# Patient Record
Sex: FEMALE | Race: BLACK OR AFRICAN AMERICAN | NOT HISPANIC OR LATINO | URBAN - METROPOLITAN AREA
[De-identification: names, ages, dates, MRNs, and addresses within clinical notes are randomized per-mention and may not be internally consistent; named-entity substitution may affect disease eponyms.]

---

## 2018-10-19 PROBLEM — R10.9 ABDOMINAL PAIN AFFECTING PREGNANCY: Status: ACTIVE | Noted: 2018-10-19

## 2018-10-19 PROBLEM — O26.899 ABDOMINAL PAIN AFFECTING PREGNANCY: Status: ACTIVE | Noted: 2018-10-19

## 2018-10-22 ENCOUNTER — LAB VISIT (OUTPATIENT)
Dept: LAB | Facility: HOSPITAL | Age: 28
End: 2018-10-22
Attending: OBSTETRICS & GYNECOLOGY
Payer: MEDICAID

## 2018-10-22 ENCOUNTER — INITIAL PRENATAL (OUTPATIENT)
Dept: OBSTETRICS AND GYNECOLOGY | Facility: CLINIC | Age: 28
End: 2018-10-22
Payer: MEDICAID

## 2018-10-22 VITALS
WEIGHT: 161.25 LBS | SYSTOLIC BLOOD PRESSURE: 122 MMHG | BODY MASS INDEX: 28.57 KG/M2 | DIASTOLIC BLOOD PRESSURE: 76 MMHG

## 2018-10-22 DIAGNOSIS — O36.5931 SMALL FOR DATES AFFECTING MANAGEMENT OF MOTHER, THIRD TRIMESTER, FETUS 1: ICD-10-CM

## 2018-10-22 DIAGNOSIS — Z34.83 ENCOUNTER FOR SUPERVISION OF OTHER NORMAL PREGNANCY IN THIRD TRIMESTER: ICD-10-CM

## 2018-10-22 DIAGNOSIS — Z34.83 ENCOUNTER FOR SUPERVISION OF OTHER NORMAL PREGNANCY IN THIRD TRIMESTER: Primary | ICD-10-CM

## 2018-10-22 LAB
ABO + RH BLD: NORMAL
ALBUMIN SERPL BCP-MCNC: 2.9 G/DL
ALP SERPL-CCNC: 103 U/L
ALT SERPL W/O P-5'-P-CCNC: 19 U/L
ANION GAP SERPL CALC-SCNC: 9 MMOL/L
AST SERPL-CCNC: 25 U/L
BILIRUB SERPL-MCNC: 0.3 MG/DL
BLD GP AB SCN CELLS X3 SERPL QL: NORMAL
BUN SERPL-MCNC: 6 MG/DL
CALCIUM SERPL-MCNC: 9.7 MG/DL
CHLORIDE SERPL-SCNC: 105 MMOL/L
CO2 SERPL-SCNC: 23 MMOL/L
CREAT SERPL-MCNC: 0.7 MG/DL
ERYTHROCYTE [DISTWIDTH] IN BLOOD BY AUTOMATED COUNT: 14.2 %
EST. GFR  (AFRICAN AMERICAN): >60 ML/MIN/1.73 M^2
EST. GFR  (NON AFRICAN AMERICAN): >60 ML/MIN/1.73 M^2
GLUCOSE SERPL-MCNC: 76 MG/DL
HCT VFR BLD AUTO: 36.5 %
HGB BLD-MCNC: 11.4 G/DL
HGB S BLD QL SOLY: NEGATIVE
HIV 1+2 AB+HIV1 P24 AG SERPL QL IA: NEGATIVE
MCH RBC QN AUTO: 21.8 PG
MCHC RBC AUTO-ENTMCNC: 31.2 G/DL
MCV RBC AUTO: 70 FL
PLATELET # BLD AUTO: 208 K/UL
PMV BLD AUTO: 11.2 FL
POTASSIUM SERPL-SCNC: 3.8 MMOL/L
PROT SERPL-MCNC: 7 G/DL
RBC # BLD AUTO: 5.24 M/UL
SODIUM SERPL-SCNC: 137 MMOL/L
TSH SERPL DL<=0.005 MIU/L-ACNC: 0.97 UIU/ML
WBC # BLD AUTO: 8.17 K/UL

## 2018-10-22 PROCEDURE — 86592 SYPHILIS TEST NON-TREP QUAL: CPT

## 2018-10-22 PROCEDURE — 80053 COMPREHEN METABOLIC PANEL: CPT

## 2018-10-22 PROCEDURE — 86762 RUBELLA ANTIBODY: CPT

## 2018-10-22 PROCEDURE — 99203 OFFICE O/P NEW LOW 30 MIN: CPT | Mod: TH,S$PBB,, | Performed by: OBSTETRICS & GYNECOLOGY

## 2018-10-22 PROCEDURE — 84443 ASSAY THYROID STIM HORMONE: CPT

## 2018-10-22 PROCEDURE — 87340 HEPATITIS B SURFACE AG IA: CPT

## 2018-10-22 PROCEDURE — 87660 TRICHOMONAS VAGIN DIR PROBE: CPT

## 2018-10-22 PROCEDURE — 99999 PR PBB SHADOW E&M-EST. PATIENT-LVL III: CPT | Mod: PBBFAC,,, | Performed by: OBSTETRICS & GYNECOLOGY

## 2018-10-22 PROCEDURE — 86703 HIV-1/HIV-2 1 RESULT ANTBDY: CPT

## 2018-10-22 PROCEDURE — 87086 URINE CULTURE/COLONY COUNT: CPT

## 2018-10-22 PROCEDURE — 85660 RBC SICKLE CELL TEST: CPT

## 2018-10-22 PROCEDURE — 87491 CHLMYD TRACH DNA AMP PROBE: CPT

## 2018-10-22 PROCEDURE — 85027 COMPLETE CBC AUTOMATED: CPT

## 2018-10-22 PROCEDURE — 86850 RBC ANTIBODY SCREEN: CPT

## 2018-10-22 PROCEDURE — 99213 OFFICE O/P EST LOW 20 MIN: CPT | Mod: PBBFAC,PO,25 | Performed by: OBSTETRICS & GYNECOLOGY

## 2018-10-22 NOTE — PROGRESS NOTES
Affirm/GC/CT done. Consents signed. OB labs although transfer of care from [facility]  OBSTETRIC HISTORY:   OB History      Para Term  AB Living    2 1       1    SAB TAB Ectopic Multiple Live Births            1           COMPREHENSIVE GYN HISTORY:  PAP History: Denies abnormal Paps.  Infection History: Denies STDs. Denies PID.  Benign History: Denies uterine fibroids. Denies ovarian cysts. Denies endometriosis.   Cancer History: Denies cervical cancer. Denies uterine cancer or hyperplasia. Denies ovarian cancer. Denies vulvar cancer or pre-cancer. Denies vaginal cancer or pre-cancer. Denies breast cancer. Denies colon cancer.  Sexual Activity History:   reports that she currently engages in sexual activity and has had partners who are Male. She reports using the following method of birth control/protection: None.   HPI:   28 y.o.  No LMP recorded. Patient is pregnant.   Patient is  here for pregnancy. Had prenatal care in the [facility]. No problems last pregnancy and nothing currently other than todays complaint of pressure. She denies bladder, breast and bowel complaints.    ROS:  GENERAL: Denies weight gain or weight loss. Feeling well overall.   SKIN: Denies rash or lesions.   HEAD: Denies headache.   NODES: Denies enlarged lymph nodes.   CHEST: Denies shortness of breath.   ABDOMEN: No abdominal pain, constipation, diarrhea, nausea, vomiting or rectal bleeding.   URINARY: No frequency, dysuria, hematuria, or burning on urination.  REPRODUCTIVE: See HPI.   BREASTS: The patient denies pain, lumps, or nipple discharge.   HEMATOLOGIC: No easy bruisability.   MUSCULOSKELETAL: Denies joint pain or back pain.   NEUROLOGIC: Denies weakness.   PSYCHIATRIC: Denies depression, anxiety or mood swings.    PE:   /76   APPEARANCE: Well nourished, well developed, in no acute distress.  ABDOMEN: Soft. No tenderness or masses. No hernias.  PELVIC:   VULVA: No lesions. Normal female genitalia.  URETHRAL  MEATUS: Normal size and location, no lesions, no prolapse.  URETHRA: No masses, tenderness, prolapse or scarring.  VAGINA: Moist and well rugated, some discharge, no significant cystocele or rectocele.  CERVIX: No lesions and discharge. Cervix closed  UTERUS: Normal size, regular shape, mobile, non-tender, bladder base nontender.  ADNEXA: No masses or tenderness.    ASSESSMENT:  Pregnancy  Discharge  Pressure  Transfer of care    PLAN:  RTO 1 week  US small for dates follow up anatomy and growth

## 2018-10-23 LAB
C TRACH DNA SPEC QL NAA+PROBE: NOT DETECTED
CANDIDA RRNA VAG QL PROBE: NEGATIVE
G VAGINALIS RRNA GENITAL QL PROBE: POSITIVE
HBV SURFACE AG SERPL QL IA: NEGATIVE
N GONORRHOEA DNA SPEC QL NAA+PROBE: NOT DETECTED
RPR SER QL: NORMAL
RUBV IGG SER-ACNC: 40.8 IU/ML
RUBV IGG SER-IMP: REACTIVE
T VAGINALIS RRNA GENITAL QL PROBE: NEGATIVE

## 2018-10-24 ENCOUNTER — TELEPHONE (OUTPATIENT)
Dept: OBSTETRICS AND GYNECOLOGY | Facility: CLINIC | Age: 28
End: 2018-10-24

## 2018-10-24 LAB — BACTERIA UR CULT: NO GROWTH

## 2018-10-24 RX ORDER — METRONIDAZOLE 250 MG/1
250 TABLET ORAL 3 TIMES DAILY
Qty: 21 TABLET | Refills: 0 | Status: SHIPPED | OUTPATIENT
Start: 2018-10-24 | End: 2018-10-31

## 2018-10-24 NOTE — TELEPHONE ENCOUNTER
----- Message from Mallory Tipton sent at 10/24/2018  1:14 PM CDT -----  Contact: [phone]/ self   Patient called in returning your call. Please advise.

## 2018-10-31 ENCOUNTER — ROUTINE PRENATAL (OUTPATIENT)
Dept: OBSTETRICS AND GYNECOLOGY | Facility: CLINIC | Age: 28
End: 2018-10-31
Payer: MEDICAID

## 2018-10-31 ENCOUNTER — PROCEDURE VISIT (OUTPATIENT)
Dept: OBSTETRICS AND GYNECOLOGY | Facility: CLINIC | Age: 28
End: 2018-10-31
Payer: MEDICAID

## 2018-10-31 VITALS
BODY MASS INDEX: 28.76 KG/M2 | DIASTOLIC BLOOD PRESSURE: 76 MMHG | WEIGHT: 162.38 LBS | SYSTOLIC BLOOD PRESSURE: 108 MMHG

## 2018-10-31 DIAGNOSIS — Z34.83 ENCOUNTER FOR SUPERVISION OF OTHER NORMAL PREGNANCY IN THIRD TRIMESTER: Primary | ICD-10-CM

## 2018-10-31 DIAGNOSIS — Z3A.33 33 WEEKS GESTATION OF PREGNANCY: ICD-10-CM

## 2018-10-31 DIAGNOSIS — Z36.3 ANTENATAL SCREENING FOR MALFORMATION USING ULTRASONICS: Primary | ICD-10-CM

## 2018-10-31 DIAGNOSIS — O36.5931 SMALL FOR DATES AFFECTING MANAGEMENT OF MOTHER, THIRD TRIMESTER, FETUS 1: ICD-10-CM

## 2018-10-31 PROCEDURE — 76805 OB US >/= 14 WKS SNGL FETUS: CPT | Mod: PBBFAC,PO

## 2018-10-31 PROCEDURE — 99213 OFFICE O/P EST LOW 20 MIN: CPT | Mod: TH,25,S$PBB, | Performed by: OBSTETRICS & GYNECOLOGY

## 2018-10-31 PROCEDURE — 99999 PR PBB SHADOW E&M-EST. PATIENT-LVL II: CPT | Mod: PBBFAC,,, | Performed by: OBSTETRICS & GYNECOLOGY

## 2018-10-31 PROCEDURE — 76805 OB US >/= 14 WKS SNGL FETUS: CPT | Mod: 26,S$PBB,, | Performed by: OBSTETRICS & GYNECOLOGY

## 2018-10-31 PROCEDURE — 99212 OFFICE O/P EST SF 10 MIN: CPT | Mod: PBBFAC,TH,PO,25 | Performed by: OBSTETRICS & GYNECOLOGY

## 2018-11-14 ENCOUNTER — ROUTINE PRENATAL (OUTPATIENT)
Dept: OBSTETRICS AND GYNECOLOGY | Facility: CLINIC | Age: 28
End: 2018-11-14
Payer: MEDICAID

## 2018-11-14 VITALS
SYSTOLIC BLOOD PRESSURE: 120 MMHG | BODY MASS INDEX: 29.33 KG/M2 | WEIGHT: 165.56 LBS | DIASTOLIC BLOOD PRESSURE: 72 MMHG

## 2018-11-14 DIAGNOSIS — Z3A.35 35 WEEKS GESTATION OF PREGNANCY: ICD-10-CM

## 2018-11-14 DIAGNOSIS — Z36.85 ANTENATAL SCREENING FOR STREPTOCOCCUS B: ICD-10-CM

## 2018-11-14 DIAGNOSIS — Z34.83 ENCOUNTER FOR SUPERVISION OF OTHER NORMAL PREGNANCY IN THIRD TRIMESTER: Primary | ICD-10-CM

## 2018-11-14 PROCEDURE — 99212 OFFICE O/P EST SF 10 MIN: CPT | Mod: PBBFAC,TH,PO | Performed by: OBSTETRICS & GYNECOLOGY

## 2018-11-14 PROCEDURE — 87081 CULTURE SCREEN ONLY: CPT

## 2018-11-14 PROCEDURE — 99999 PR PBB SHADOW E&M-EST. PATIENT-LVL II: CPT | Mod: PBBFAC,,, | Performed by: OBSTETRICS & GYNECOLOGY

## 2018-11-14 PROCEDURE — 99213 OFFICE O/P EST LOW 20 MIN: CPT | Mod: TH,S$PBB,, | Performed by: OBSTETRICS & GYNECOLOGY

## 2018-11-14 NOTE — PROGRESS NOTES
Patient is complaining of having pressure in the lower pelvic area since yesterday.Patient has trace of leukocytes in urine.

## 2018-11-17 LAB — BACTERIA SPEC AEROBE CULT: NORMAL

## 2018-11-26 ENCOUNTER — ROUTINE PRENATAL (OUTPATIENT)
Dept: OBSTETRICS AND GYNECOLOGY | Facility: CLINIC | Age: 28
End: 2018-11-26
Payer: MEDICAID

## 2018-11-26 VITALS
BODY MASS INDEX: 29.91 KG/M2 | SYSTOLIC BLOOD PRESSURE: 116 MMHG | DIASTOLIC BLOOD PRESSURE: 66 MMHG | WEIGHT: 168.88 LBS

## 2018-11-26 DIAGNOSIS — Z34.83 ENCOUNTER FOR SUPERVISION OF OTHER NORMAL PREGNANCY IN THIRD TRIMESTER: Primary | ICD-10-CM

## 2018-11-26 DIAGNOSIS — Z3A.37 37 WEEKS GESTATION OF PREGNANCY: ICD-10-CM

## 2018-11-26 PROCEDURE — 99213 OFFICE O/P EST LOW 20 MIN: CPT | Mod: TH,S$PBB,, | Performed by: OBSTETRICS & GYNECOLOGY

## 2018-11-26 PROCEDURE — 99999 PR PBB SHADOW E&M-EST. PATIENT-LVL II: CPT | Mod: PBBFAC,,, | Performed by: OBSTETRICS & GYNECOLOGY

## 2018-11-26 PROCEDURE — 99212 OFFICE O/P EST SF 10 MIN: CPT | Mod: PBBFAC,TH,PO | Performed by: OBSTETRICS & GYNECOLOGY

## 2018-11-26 NOTE — PROGRESS NOTES
Patient complaining of her Right side of abdomen hurting only when she touches it. Patient also complaining of clear discharge, more than usual.

## 2018-11-29 ENCOUNTER — ANESTHESIA (OUTPATIENT)
Dept: OBSTETRICS AND GYNECOLOGY | Facility: HOSPITAL | Age: 28
End: 2018-11-29
Payer: MEDICAID

## 2018-11-29 ENCOUNTER — ANESTHESIA EVENT (OUTPATIENT)
Dept: OBSTETRICS AND GYNECOLOGY | Facility: HOSPITAL | Age: 28
End: 2018-11-29
Payer: MEDICAID

## 2018-11-29 ENCOUNTER — HOSPITAL ENCOUNTER (INPATIENT)
Facility: HOSPITAL | Age: 28
LOS: 2 days | Discharge: HOME | End: 2018-12-01
Attending: OBSTETRICS & GYNECOLOGY | Admitting: OBSTETRICS & GYNECOLOGY
Payer: MEDICAID

## 2018-11-29 DIAGNOSIS — Z3A.37 37 WEEKS GESTATION OF PREGNANCY: ICD-10-CM

## 2018-11-29 LAB
ABO + RH BLD: NORMAL
BASOPHILS # BLD AUTO: 0.01 K/UL
BASOPHILS NFR BLD: 0.1 %
BLD GP AB SCN CELLS X3 SERPL QL: NORMAL
DIFFERENTIAL METHOD: ABNORMAL
EOSINOPHIL # BLD AUTO: 0.1 K/UL
EOSINOPHIL NFR BLD: 0.7 %
ERYTHROCYTE [DISTWIDTH] IN BLOOD BY AUTOMATED COUNT: 14.2 %
HCT VFR BLD AUTO: 36.6 %
HGB BLD-MCNC: 11.4 G/DL
LYMPHOCYTES # BLD AUTO: 2.4 K/UL
LYMPHOCYTES NFR BLD: 26.7 %
MCH RBC QN AUTO: 21.6 PG
MCHC RBC AUTO-ENTMCNC: 31.1 G/DL
MCV RBC AUTO: 69 FL
MONOCYTES # BLD AUTO: 1 K/UL
MONOCYTES NFR BLD: 11 %
NEUTROPHILS # BLD AUTO: 5.4 K/UL
NEUTROPHILS NFR BLD: 61.5 %
PLATELET # BLD AUTO: 130 K/UL
PLATELET BLD QL SMEAR: ABNORMAL
PMV BLD AUTO: 11.2 FL
RBC # BLD AUTO: 5.28 M/UL
WBC # BLD AUTO: 8.79 K/UL

## 2018-11-29 PROCEDURE — 72100003 HC LABOR CARE, EA. ADDL. 8 HRS

## 2018-11-29 PROCEDURE — 25000003 PHARM REV CODE 250: Performed by: OBSTETRICS & GYNECOLOGY

## 2018-11-29 PROCEDURE — 63600175 PHARM REV CODE 636 W HCPCS

## 2018-11-29 PROCEDURE — 72100002 HC LABOR CARE, 1ST 8 HOURS

## 2018-11-29 PROCEDURE — 86850 RBC ANTIBODY SCREEN: CPT

## 2018-11-29 PROCEDURE — 85025 COMPLETE CBC W/AUTO DIFF WBC: CPT

## 2018-11-29 PROCEDURE — 11000001 HC ACUTE MED/SURG PRIVATE ROOM

## 2018-11-29 PROCEDURE — 59409 OBSTETRICAL CARE: CPT | Mod: AT,,, | Performed by: OBSTETRICS & GYNECOLOGY

## 2018-11-29 PROCEDURE — 72200004 HC VAGINAL DELIVERY LEVEL I

## 2018-11-29 PROCEDURE — 36415 COLL VENOUS BLD VENIPUNCTURE: CPT

## 2018-11-29 PROCEDURE — 0HQ9XZZ REPAIR PERINEUM SKIN, EXTERNAL APPROACH: ICD-10-PCS | Performed by: OBSTETRICS & GYNECOLOGY

## 2018-11-29 RX ORDER — FENTANYL CITRATE 50 UG/ML
INJECTION, SOLUTION INTRAMUSCULAR; INTRAVENOUS
Status: DISCONTINUED
Start: 2018-11-29 | End: 2018-11-29 | Stop reason: WASHOUT

## 2018-11-29 RX ORDER — METHYLERGONOVINE MALEATE 0.2 MG/ML
INJECTION INTRAVENOUS
Status: COMPLETED
Start: 2018-11-29 | End: 2018-11-29

## 2018-11-29 RX ORDER — OXYTOCIN/RINGER'S LACTATE 20/1000 ML
333 PLASTIC BAG, INJECTION (ML) INTRAVENOUS CONTINUOUS
Status: ACTIVE | OUTPATIENT
Start: 2018-11-29 | End: 2018-11-29

## 2018-11-29 RX ORDER — DOCUSATE SODIUM 100 MG/1
200 CAPSULE, LIQUID FILLED ORAL 2 TIMES DAILY PRN
Status: DISCONTINUED | OUTPATIENT
Start: 2018-11-29 | End: 2018-12-01 | Stop reason: HOSPADM

## 2018-11-29 RX ORDER — ROPIVACAINE HYDROCHLORIDE 2 MG/ML
INJECTION, SOLUTION EPIDURAL; INFILTRATION; PERINEURAL
Status: DISPENSED
Start: 2018-11-29 | End: 2018-11-29

## 2018-11-29 RX ORDER — OXYCODONE AND ACETAMINOPHEN 5; 325 MG/1; MG/1
1 TABLET ORAL EVERY 4 HOURS PRN
Status: DISCONTINUED | OUTPATIENT
Start: 2018-11-29 | End: 2018-12-01 | Stop reason: HOSPADM

## 2018-11-29 RX ORDER — IBUPROFEN 600 MG/1
600 TABLET ORAL EVERY 6 HOURS PRN
Status: DISCONTINUED | OUTPATIENT
Start: 2018-11-29 | End: 2018-12-01 | Stop reason: HOSPADM

## 2018-11-29 RX ORDER — OXYTOCIN/RINGER'S LACTATE 20/1000 ML
41.7 PLASTIC BAG, INJECTION (ML) INTRAVENOUS CONTINUOUS
Status: ACTIVE | OUTPATIENT
Start: 2018-11-29 | End: 2018-11-29

## 2018-11-29 RX ORDER — ACETAMINOPHEN 325 MG/1
650 TABLET ORAL EVERY 6 HOURS PRN
Status: DISCONTINUED | OUTPATIENT
Start: 2018-11-29 | End: 2018-12-01 | Stop reason: HOSPADM

## 2018-11-29 RX ORDER — MISOPROSTOL 200 UG/1
600 TABLET ORAL
Status: DISCONTINUED | OUTPATIENT
Start: 2018-11-29 | End: 2018-12-01 | Stop reason: HOSPADM

## 2018-11-29 RX ORDER — HYDROCORTISONE 25 MG/G
CREAM TOPICAL 3 TIMES DAILY PRN
Status: DISCONTINUED | OUTPATIENT
Start: 2018-11-29 | End: 2018-12-01 | Stop reason: HOSPADM

## 2018-11-29 RX ORDER — ONDANSETRON 8 MG/1
8 TABLET, ORALLY DISINTEGRATING ORAL EVERY 8 HOURS PRN
Status: DISCONTINUED | OUTPATIENT
Start: 2018-11-29 | End: 2018-12-01 | Stop reason: HOSPADM

## 2018-11-29 RX ORDER — OXYTOCIN 10 [USP'U]/ML
INJECTION, SOLUTION INTRAMUSCULAR; INTRAVENOUS
Status: COMPLETED
Start: 2018-11-29 | End: 2018-11-29

## 2018-11-29 RX ORDER — OXYTOCIN/RINGER'S LACTATE 20/1000 ML
41.65 PLASTIC BAG, INJECTION (ML) INTRAVENOUS CONTINUOUS
Status: ACTIVE | OUTPATIENT
Start: 2018-11-29 | End: 2018-11-29

## 2018-11-29 RX ORDER — SODIUM CHLORIDE, SODIUM LACTATE, POTASSIUM CHLORIDE, CALCIUM CHLORIDE 600; 310; 30; 20 MG/100ML; MG/100ML; MG/100ML; MG/100ML
INJECTION, SOLUTION INTRAVENOUS CONTINUOUS
Status: DISCONTINUED | OUTPATIENT
Start: 2018-11-29 | End: 2018-12-01 | Stop reason: HOSPADM

## 2018-11-29 RX ORDER — OXYCODONE AND ACETAMINOPHEN 10; 325 MG/1; MG/1
1 TABLET ORAL EVERY 4 HOURS PRN
Status: DISCONTINUED | OUTPATIENT
Start: 2018-11-29 | End: 2018-12-01 | Stop reason: HOSPADM

## 2018-11-29 RX ORDER — SODIUM CHLORIDE 9 MG/ML
INJECTION, SOLUTION INTRAVENOUS
Status: DISCONTINUED | OUTPATIENT
Start: 2018-11-29 | End: 2018-12-01 | Stop reason: HOSPADM

## 2018-11-29 RX ORDER — MISOPROSTOL 200 UG/1
TABLET ORAL
Status: DISPENSED
Start: 2018-11-29 | End: 2018-11-29

## 2018-11-29 RX ORDER — LIDOCAINE HYDROCHLORIDE 10 MG/ML
INJECTION INFILTRATION; PERINEURAL
Status: DISPENSED
Start: 2018-11-29 | End: 2018-11-29

## 2018-11-29 RX ADMIN — IBUPROFEN 600 MG: 600 TABLET, FILM COATED ORAL at 08:11

## 2018-11-29 RX ADMIN — METHYLERGONOVINE MALEATE 200 MCG: 0.2 INJECTION, SOLUTION INTRAMUSCULAR; INTRAVENOUS at 09:11

## 2018-11-29 RX ADMIN — OXYCODONE HYDROCHLORIDE AND ACETAMINOPHEN 1 TABLET: 10; 325 TABLET ORAL at 02:11

## 2018-11-29 RX ADMIN — OXYCODONE HYDROCHLORIDE AND ACETAMINOPHEN 1 TABLET: 10; 325 TABLET ORAL at 10:11

## 2018-11-29 RX ADMIN — MISOPROSTOL 600 MCG: 200 TABLET ORAL at 09:11

## 2018-11-29 RX ADMIN — OXYCODONE HYDROCHLORIDE AND ACETAMINOPHEN 1 TABLET: 10; 325 TABLET ORAL at 08:11

## 2018-11-29 RX ADMIN — OXYTOCIN 10 UNITS: 10 INJECTION, SOLUTION INTRAMUSCULAR; INTRAVENOUS at 09:11

## 2018-11-29 NOTE — L&D DELIVERY NOTE
[facility]  Vaginal Delivery   Operative Note    SUMMARY   Date of Surgery: 18    Pre-Operative Diagnosis:   Term pregnancy       Post-Operative Diagnosis:   Same   Viable female infant  Nuchal cord x 1  Thin meconium  Uterine atony    Surgery:     Surgeon: MD Kvng    Anesthesia: None    EBL: 900 cc    Findings:   Viable female infant with 9/9 Apgars  Placenta delivered spontaneous intact  Position: Vertex, HIREN  Nuchal cord x 1    Episiotomy:   None    Lacerations:   Periurethral first degree no repair    Specimens: None    Complications: None      Normal spontaneous vaginal delivery of live infant, skin to skin was unable to be performed due to meconium needing NNP evaluation.  Infant delivered position HIREN over intact perineum.  Nuchal cord: Yes, cord reduced at perineum.    Spontaneous delivery of placenta and IV pitocin given noting uterine atony with bleeding.  1st degree laceration noted.  Patient tolerated delivery well. Sponge needle and lap counted correctly x2.    Indications: <principal problem not specified>  Pregnancy complicated by:   Patient Active Problem List   Diagnosis    Abdominal pain affecting pregnancy    37 weeks gestation of pregnancy     Admitting GA: 37w3d    Delivery Information for  Mirza Metz    Birth information:  YOB: 2017   Time of birth: 9:06 AM   Sex: female   Head Delivery Date/Time: 2018  9:06 AM   Delivery type: Vaginal, Spontaneous   Gestational Age: 37w3d    Delivery Providers    Delivering clinician:  Coby Patel MD   Provider Role    Tawanna Chavez RN Circulator    El[facility] Surgical Tech    Mikayla Mcpherson RN Registered Nurse    Deidre Friend NP Nurse Practitioner            Measurements    Weight:  2742 g  Length:           Apgars    Living status:  Living  Apgars:   1 min.:   5 min.:   10 min.:   15 min.:   20 min.:     Skin color:   1  1       Heart rate:   2  2       Reflex irritability:   2   2       Muscle tone:   2  2       Respiratory effort:   2  2       Total:   9  9       Apgars assigned by:  LATONIA STEVEN         Operative Delivery    Forceps attempted?:  No  Vacuum extractor attempted?:  No         Shoulder Dystocia    Shoulder dystocia present?:  No           Presentation    Presentation:  Vertex  Position:  Left Occiput Anterior           Interventions/Resuscitation    Method:  Tactile Stimulation, Bulb Suctioning       Cord    Vessels:  3 vessels  Complications:  Nuchal  Nuchal Intervention:  reduced  Nuchal Cord Description:  loose nuchal cord  Number of Loops:  1  Delayed Cord Clamping?:  No  Cord Clamped Date/Time:  2018  9:06 AM  Cord Blood Disposition:  Lab, Sent with Baby  Gases Sent?:  No  Stem Cell Collection (by MD):  No       Placenta    Placenta delivery date/time:  2018 09  Placenta removal:  Spontaneous  Placenta appearance:  Intact  Placenta disposition:  discarded           Labor Events:       labor: Yes     Labor Onset Date/Time:         Dilation Complete Date/Time:         Start Pushing Date/Time:       Rupture Date/Time:              Rupture type:           Fluid Amount:        Fluid Color:        Fluid Odor:        Membrane Status (PeriCalm): INT (Intact)      Rupture Date/Time (PeriCalm):        Fluid Amount (PeriCalm):        Fluid Color (PeriCalm):         steroids:       Antibiotics given for GBS:       Induction: none     Indications for induction:        Augmentation:       Indications for augmentation:       Labor complications: None     Additional complications:          Cervical ripening:                     Delivery:      Episiotomy: None     Indication for Episiotomy:       Perineal Lacerations: None Repaired:      Periurethral Laceration: bilateral Repaired: No   Labial Laceration: none Repaired:     Sulcus Laceration: none Repaired:     Vaginal Laceration: No Repaired:     Cervical Laceration: No Repaired:     Repair suture:        Repair # of packets: 0     Vaginal delivery QBL (mL):        QBL (mL): 0     Combined Blood Loss (mL): 0     Vaginal Sweep Performed: Yes     Surgicount Correct: Yes       Other providers:       Anesthesia    Method:  None          Details (if applicable):  Trial of Labor      Categorization:      Priority:     Indications for :     Incision Type:       Additional  information:  Forceps:    Vacuum:    Breech:    Observed anomalies    Other (Comments):

## 2018-11-29 NOTE — H&P
"HPI:   Virginia Metz is a 28 y.o. female  at 37 weeks 3 days EGA who presents here for contractions and is 6cm in triage. Her prenatal care was uncomplicated.    ROS:  GENERAL: Denies weight gain or weight loss. Feeling well overall.   SKIN: Denies rash or lesions.   HEAD: Denies head injury or headache.   CHEST: Denies chest pain or shortness of breath.   CARDIOVASCULAR: Denies palpitations or left sided chest pain.   ABDOMEN: No constipation, diarrhea, nausea, vomiting or rectal bleeding.   URINARY: No frequency, dysuria, hematuria, or burning on urination.  REPRODUCTIVE: See HPI.     Past Medical History:   Diagnosis Date    Asthma      No past surgical history on file.  Family History   Family history unknown: Yes     Patient has no known allergies.       PE:   /84   Pulse 88   Ht 5' 3" (1.6 m)   Wt 76.2 kg (168 lb)   Breastfeeding? No   BMI 29.76 kg/m²   APPEARANCE: Well nourished, well developed, in no acute distress.  CHEST: Lungs clear to auscultation.  HEART: Regular rate and rhythm, no murmurs, rubs or gallops.  ABDOMEN:  Gravid. NTND soft   SVE: now 8cm    Assessment:  IUP 37 weeks 3 days  Active labor  Category 1 Fetal Heart Tracing    Plan:   Expect delivery soon     "

## 2018-11-29 NOTE — PLAN OF CARE
0800    Fetal Assessment  External  135 bpm  Moderate varability  accels not present  variables present  Uterine Activity  External   Contractions 2-4 min  50-80 sec  Moderate in intensity   Soft resting tone    0830    Fetal Assessment  External  130 bpm  Moderate varability  accels present  variables present  Uterine Activity  External   Contractions 2-5 min  50-80 sec  Moderate in intensity   Soft resting tone    0900     Fetal Assessment  External  125 bpm  Moderate varability  accels present  variables present  Uterine Activity  External   Contractions 2-4 min  50-80 sec  Moderate in intensity   Soft resting tone

## 2018-11-29 NOTE — LACTATION NOTE
11/29/18 1015   Maternal Infant Assessment   Breast Size Issue none   Breast Shape Bilateral:;pendulous   Breast Density Bilateral:;soft   Areola Bilateral:;elastic   Nipple(s) Bilateral:;flat;graspable  (nipps flat but breast very moldable,nipps harley sl w/ stim.)   Nipple Symptoms bilateral:;other (see comments)  (no redness or tenderness to nipples)   Infant Assessment   Mouth Size average   Sucking Reflex present   Rooting Reflex present   Swallow Reflex present   LATCH Score   Latch 2-->grasps breast, tongue down, lips flanged, rhythmic sucking   Audible Swallowing 2-->spontaneous and intermittent (24 hrs old)   Type Of Nipple 2-->everted (after stimulation)  (sl everts w/ stimulation, br graspable)   Comfort (Breast/Nipple) 2-->soft/nontender   Hold (Positioning) 1-->minimal assist, teach one side: mother does other, staff holds   Score (less than 7 for 2/more consecutive times, consult Lactation Consultant) 9   Breasts WDL   Breasts WDL WDL   Pain/Comfort Assessments   Pain Assessment Performed Yes   Acceptable Comfort Level 4       Number Scale   Presence of Pain complains of pain/discomfort   Location - Side Bilateral   Location - Orientation lower   Location abdomen   Pain Rating: Rest 5   Pain Rating: Activity 5   Maternal Infant Feeding   Maternal Preparation breast care;hand hygiene   Maternal Emotional State relaxed   Infant Positioning cradle  (left cradle hold,deep latch w/ lips flanged)   Signs of Milk Transfer audible swallow;infant jaw motion present   Presence of Pain no   Time Spent (min) 15-30 min   Latch Assistance yes  (for deeper latch)   Engorgement Measures complete emptying encouraged;supportive bra encouraged   Breastfeeding Education adequate infant intake;adequate milk volume;importance of skin-to-skin contact;increasing milk supply;other (see comments)  (s/d,s2s,fdg.freq/akosua,I&O,nipp care, benefits,etc.)   Breastfeeding History   Breastfeeding History no  (did not BF other baby,  baby would not latch)   Infant First Feeding   Skin-to-Skin Contact, Duration continued   Feeding Infant   Feeding Readiness Cues eager;rooting;sucking motion present;sustained alertness   Feeding Tolerance/Success coordinated suck;coordinated swallow;alert for feeding;eager;strong suck   Effective Latch During Feeding yes   Audible Swallow yes   Suck/Swallow Coordination present   Skin-to-Skin Contact During Feeding yes   Lactation Referrals   Lactation Consult Initial assessment;Knowledge deficit;Other (Comment)  (reviewed [facility])   Lactation Interventions   Attachment Promotion breastfeeding assistance provided;counseling provided;environment adjusted;face-to-face positioning promoted;family involvement promoted;infant-mother separation minimized;privacy provided;role responsibility promoted;rooming-in promoted;skin-to-skin contact encouraged   Breastfeeding Assistance assisted with positioning;feeding cue recognition promoted;feeding on demand promoted;support offered   Maternal Breastfeeding Support diary/feeding log utilized;encouragement offered;infant-mother separation minimized;lactation counseling provided   Latch Promotion positioning assisted;infant moved to breast

## 2018-11-29 NOTE — PLAN OF CARE
Problem: Patient Care Overview  Goal: Plan of Care Review  Outcome: Ongoing (interventions implemented as appropriate)  1300 - assumed care of pt.  Vss, nad, pain well controlled w/po pain meds, tolerating regular diet, bleeding light.  Poc: pain management as needed, po hydration/ambulation encouraged, oriented pt to room.  Reviewed poc w/pt.  Pt verbalized understanding.

## 2018-11-29 NOTE — ANESTHESIA PREPROCEDURE EVALUATION
11/29/2018  Virginia Metz is a 28 y.o., female. Actively laboring. Currently not interested in epidural.     Past Medical History:   Diagnosis Date    Asthma      Pre-op Assessment    I have reviewed the Patient Summary Reports.     I have reviewed the Nursing Notes.   I have reviewed the Medications.     Review of Systems  Anesthesia Hx:  No previous Anesthesia  Neg history of prior surgery.   Social:  Non-Smoker, No Alcohol Use    Hematology/Oncology:     Oncology Normal     EENT/Dental:EENT/Dental Normal   Cardiovascular:  Cardiovascular Normal Exercise tolerance: good     Pulmonary:   Asthma    Renal/:  Renal/ Normal     Hepatic/GI:   GERD    Musculoskeletal:  Musculoskeletal Normal    Neurological:  Neurology Normal    Endocrine:  Endocrine Normal    Psych:  Psychiatric Normal           Physical Exam  General:  Well nourished    Airway/Jaw/Neck:  Airway Findings: Mouth Opening: Normal Tongue: Normal  General Airway Assessment: Adult  Mallampati: III  TM Distance: Normal, at least 6 cm     Eyes/Ears/Nose:  EYES/EARS/NOSE FINDINGS: Normal   Dental:  Dental Findings: In tact   Chest/Lungs:  Chest/Lungs Clear    Heart/Vascular:  Heart Findings: Normal       Mental Status:  Mental Status Findings: Normal        Anesthesia Plan  Type of Anesthesia, risks & benefits discussed:  Anesthesia Type:  CSE, epidural, spinal, general  Patient's Preference:   Intra-op Monitoring Plan:   Intra-op Monitoring Plan Comments:   Post Op Pain Control Plan:   Post Op Pain Control Plan Comments:   Induction:    Beta Blocker:         Informed Consent: Patient understands risks and agrees with Anesthesia plan.  Questions answered. Anesthesia consent signed with patient.  ASA Score: 2  emergent   Day of Surgery Review of History & Physical:

## 2018-11-30 LAB
ANISOCYTOSIS BLD QL SMEAR: SLIGHT
BASOPHILS # BLD AUTO: 0.02 K/UL
BASOPHILS NFR BLD: 0.2 %
DIFFERENTIAL METHOD: ABNORMAL
EOSINOPHIL # BLD AUTO: 0.1 K/UL
EOSINOPHIL NFR BLD: 0.5 %
ERYTHROCYTE [DISTWIDTH] IN BLOOD BY AUTOMATED COUNT: 14.3 %
GIANT PLATELETS BLD QL SMEAR: PRESENT
HCT VFR BLD AUTO: 33.4 %
HGB BLD-MCNC: 10.2 G/DL
HYPOCHROMIA BLD QL SMEAR: ABNORMAL
LYMPHOCYTES # BLD AUTO: 2.3 K/UL
LYMPHOCYTES NFR BLD: 19.1 %
MCH RBC QN AUTO: 21.3 PG
MCHC RBC AUTO-ENTMCNC: 30.5 G/DL
MCV RBC AUTO: 70 FL
MONOCYTES # BLD AUTO: 1.2 K/UL
MONOCYTES NFR BLD: 9.7 %
NEUTROPHILS # BLD AUTO: 8.4 K/UL
NEUTROPHILS NFR BLD: 70.5 %
PLATELET # BLD AUTO: 176 K/UL
PLATELET BLD QL SMEAR: ABNORMAL
PMV BLD AUTO: 11.7 FL
POIKILOCYTOSIS BLD QL SMEAR: SLIGHT
RBC # BLD AUTO: 4.8 M/UL
WBC # BLD AUTO: 12.01 K/UL

## 2018-11-30 PROCEDURE — 11000001 HC ACUTE MED/SURG PRIVATE ROOM

## 2018-11-30 PROCEDURE — 36415 COLL VENOUS BLD VENIPUNCTURE: CPT

## 2018-11-30 PROCEDURE — 99231 SBSQ HOSP IP/OBS SF/LOW 25: CPT | Mod: ,,, | Performed by: OBSTETRICS & GYNECOLOGY

## 2018-11-30 PROCEDURE — 85025 COMPLETE CBC W/AUTO DIFF WBC: CPT

## 2018-11-30 PROCEDURE — 25000003 PHARM REV CODE 250: Performed by: OBSTETRICS & GYNECOLOGY

## 2018-11-30 RX ORDER — OXYCODONE AND ACETAMINOPHEN 5; 325 MG/1; MG/1
1 TABLET ORAL EVERY 4 HOURS PRN
Qty: 15 TABLET | Refills: 0 | Status: SHIPPED | OUTPATIENT
Start: 2018-11-30 | End: 2019-01-03

## 2018-11-30 RX ORDER — IBUPROFEN 600 MG/1
600 TABLET ORAL EVERY 6 HOURS PRN
Qty: 60 TABLET | Refills: 1 | Status: SHIPPED | OUTPATIENT
Start: 2018-11-30

## 2018-11-30 RX ADMIN — IBUPROFEN 600 MG: 600 TABLET, FILM COATED ORAL at 06:11

## 2018-11-30 NOTE — ANESTHESIA POSTPROCEDURE EVALUATION
"Anesthesia Post Evaluation    Patient: Virginia Metz    Procedure(s) Performed: * No procedures listed *        Anesthetic complications: no              Visit Vitals  /81 (BP Location: Left arm, Patient Position: Lying)   Pulse 78   Temp 36.9 °C (98.5 °F) (Oral)   Resp 18   Ht 5' 3" (1.6 m)   Wt 76.2 kg (168 lb)   SpO2 100%   Breastfeeding? Yes   BMI 29.76 kg/m²       Pain/Yanet Score: Pain Rating Prior to Med Admin: 4 (11/30/2018  6:11 AM)  Pain Rating Post Med Admin: 0 (11/29/2018  9:40 PM)      Post Anesthesia Evaluation  Anesthesia Type: CSE  Patient Location: OB floor  Post Pain: Adequate analgesia  Post Assessment: no apparent anesthetic complications and tolerated procedure well  Post Vital Signs: stable  Was patient able to participate in evaluation? Yes  Level of Consciousness: awake, alert  and oriented  Nausea/Vomiting: no nausea/no vomiting  Complications: none  Airway Patency: patent  Respiratory: unassisted  Cardiovascular: stable  Hydration: euvolemic  Follow-up Needed: No    No catheter in back  No headache/neckache/backache  Full return of neurological function  Able to urinate  Advised patient to report any new problems of back pain, especially with fever or decreasing bladder function occurring during coming days to weeks    Awake, alert & oriented  yes  Vital signs stable  yes  Pain controlled  yes  No nausea/vomiting  yes  Adequate hydration  yes    "

## 2018-11-30 NOTE — PROGRESS NOTES
"PPD#1 S/P     Subjective: Complaints of cramping. Had PP hemorrhage immediately after delivery controlled with Cytotec and Methergine x 2. Patient denies any weakness or dizziness.     Objective: /73 (BP Location: Right arm, Patient Position: Sitting)   Pulse 81   Temp 98.4 °F (36.9 °C) (Oral)   Resp 18   Ht 5' 3" (1.6 m)   Wt 76.2 kg (168 lb)   SpO2 100%   Breastfeeding? Yes   BMI 29.76 kg/m²     H/H:   Lab Results   Component Value Date    WBC 8.79 2018    HGB 11.4 (L) 2018    HCT 36.6 (L) 2018    MCV 69 (L) 2018     (L) 2018       Fundus: Firm, non- tender  Lochia: Light to Moderate  Extremities: Non-tender, no edema    Assessment:   PPD #1 S/P   PP Hemorrhage-- awaiting PP CBC but wel controlled PP hemorrhage with uterotonics    Plan:   Routine progressive care  Rx sent downstairs      "

## 2018-11-30 NOTE — LACTATION NOTE
"   11/30/18 1250   Maternal Infant Assessment   Breast Density Bilateral:;soft   Areola Bilateral:;elastic   Nipple(s) Bilateral:;graspable;everted  (w/ stimulation)   Nipple Symptoms bilateral:;tender;bruised  (compression stripes)   Infant Assessment   Mouth Size average   Tongue/Frenulum Symptoms frenulum marginal   Frenulum marginal   Sucking Reflex present   Rooting Reflex present   Swallow Reflex present   Breasts WDL   Breasts WDL WDL   Pain/Comfort Assessments   Pain Assessment Performed Yes       Number Scale   Presence of Pain complains of pain/discomfort   Location - Side Bilateral   Location nipple(s)   Pain Rating: Activity 6   Factors that Aggravate Pain other (see comments)  (BR)   Factors that Relieve Pain other (see comments)  (colostrum; lanolin)   Maternal Infant Feeding   Maternal Preparation breast care   Maternal Emotional State assist needed;relaxed   Infant Positioning clutch/"football"   Signs of Milk Transfer infant jaw motion present   Presence of Pain yes   Pain Location nipples, bilateral   Pain Description soreness   Comfort Measures Before/During Feeding infant position adjusted;latch adjusted;suction broken using finger   Breast Milk Supply Volume (ml) (expresses large drops of colostrum very easily)   Time Spent (min) 15-30 min   Latch Assistance yes   Breastfeeding Education adequate infant intake;adequate milk volume;importance of skin-to-skin contact;increasing milk supply;milk expression, hand   Feeding Infant   Feeding Tolerance/Success adequate pause for breath;arousal required;coordinated suck;coordinated swallow;strong suck;suck inconsistent  (min stimulation needed)   Effective Latch During Feeding yes   Suck/Swallow Coordination present   Lactation Referrals   Lactation Consult Breast/nipple pain;Breastfeeding assessment;Follow up;Knowledge deficit   Lactation Referrals WIC (women, infants and children) program   Lactation Follow-up Date/Time ([facility]) peer counselor referral " submitted   Lactation Interventions   Attachment Promotion breastfeeding assistance provided;counseling provided;face-to-face positioning promoted;family involvement promoted;privacy provided;skin-to-skin contact encouraged   Breast Care: Breastfeeding milk massaged towards nipple;lanolin to nipple(s) applied   Breastfeeding Assistance assisted with positioning;assisted with techniques for flat/inverted nipples;feeding cue recognition promoted;feeding on demand promoted;feeding session observed;infant latch-on verified;infant stimulated to wakeful state;infant suck/swallow verified;milk expression/pumping;nipple shell utilized;support offered   Maternal Breastfeeding Support diary/feeding log utilized;encouragement offered;lactation counseling provided;maternal rest encouraged   Latch Promotion positioning assisted;infant moved to breast;suck stimulated with colostrum drop

## 2018-11-30 NOTE — PLAN OF CARE
Problem: Patient Care Overview  Goal: Plan of Care Review  Outcome: Ongoing (interventions implemented as appropriate)  Resumed care of pt from CHANDRIKA Blair, at 1515.  POC reviewed with pt around 1600; verbalized acceptance and understanding.  Pt's VS stable.  Remains free from falls and injury.  Pain controlled with ordered meds.  Bonding well with baby.  Baby tolerating feedings; voiding/stooling appropriately.  Exclusively breastfeeding.  Family at bedside to offer support. MARION.

## 2018-12-01 VITALS
HEIGHT: 63 IN | RESPIRATION RATE: 18 BRPM | SYSTOLIC BLOOD PRESSURE: 119 MMHG | DIASTOLIC BLOOD PRESSURE: 78 MMHG | TEMPERATURE: 98 F | OXYGEN SATURATION: 100 % | HEART RATE: 82 BPM | BODY MASS INDEX: 29.77 KG/M2 | WEIGHT: 168 LBS

## 2018-12-01 PROCEDURE — 90471 IMMUNIZATION ADMIN: CPT | Performed by: OBSTETRICS & GYNECOLOGY

## 2018-12-01 PROCEDURE — 25000003 PHARM REV CODE 250: Performed by: OBSTETRICS & GYNECOLOGY

## 2018-12-01 PROCEDURE — 63600175 PHARM REV CODE 636 W HCPCS: Performed by: OBSTETRICS & GYNECOLOGY

## 2018-12-01 PROCEDURE — 90715 TDAP VACCINE 7 YRS/> IM: CPT | Performed by: OBSTETRICS & GYNECOLOGY

## 2018-12-01 PROCEDURE — 99231 SBSQ HOSP IP/OBS SF/LOW 25: CPT | Mod: ,,, | Performed by: OBSTETRICS & GYNECOLOGY

## 2018-12-01 RX ADMIN — IBUPROFEN 600 MG: 600 TABLET, FILM COATED ORAL at 01:12

## 2018-12-01 RX ADMIN — CLOSTRIDIUM TETANI TOXOID ANTIGEN (FORMALDEHYDE INACTIVATED), CORYNEBACTERIUM DIPHTHERIAE TOXOID ANTIGEN (FORMALDEHYDE INACTIVATED), BORDETELLA PERTUSSIS TOXOID ANTIGEN (GLUTARALDEHYDE INACTIVATED), BORDETELLA PERTUSSIS FILAMENTOUS HEMAGGLUTININ ANTIGEN (FORMALDEHYDE INACTIVATED), BORDETELLA PERTUSSIS PERTACTIN ANTIGEN, AND BORDETELLA PERTUSSIS FIMBRIAE 2/3 ANTIGEN 0.5 ML: 5; 2; 2.5; 5; 3; 5 INJECTION, SUSPENSION INTRAMUSCULAR at 10:12

## 2018-12-01 RX ADMIN — OXYCODONE HYDROCHLORIDE AND ACETAMINOPHEN 1 TABLET: 5; 325 TABLET ORAL at 01:12

## 2018-12-01 NOTE — DISCHARGE INSTRUCTIONS
"Patient Discharge Instructions for Postpartum Women    Resume Regular Diet  Increase activity gradually, no heavy lifting  Shower  No tampons, douching or sexual intercourse.  Discuss birth control options with your physician.  Wear a support bra  Return to work/school when you've been cleared by a physician    Call your physician if     *Fever of 100.4 or higher  *Persistent nausea/ vomiting  *Incisional drainage  *Heavy vaginal bleeding or large clots (Heavy bleeding is soaking 1 pad in an hour)  *Swelling and pain in arms or legs  *Severe headaches, blurred vision or fainting  *Shortness of breath  *Frequency and burning with urination  *Signs of postpartum depression, discuss these signs with your physician    Call lactation services for questions regarding feeding, nipple and breast care, and general questions about lactation.  They can be reached at [phone]         Understanding Postpartum Depression    You've just had a baby.  You know you should be excited and happy.  But instead you find yourself crying for no reason.  You may have trouble coping with your daily tasks.  You feel sad, tired, and hopeless most of the time.  You may even feel ashamed or guilty.  But what you're going through is not your fault and you can feel better.  Talk to your doctor.  He or she can help.    Depression After Childbirth    You may be weepy and tired right after giving birth.  These feelings are normal.  They're sometimes called the "baby blues."  These blues go away 2-3 weeks.  However, postpartum (meaning "after birth") depression lasts much longer and is more sever than the "baby blues."  It can make you feel sad and hopeless.  You may also fear that your baby will be harmed and worry about being a bad mother.      What is Depression?    Depression is a mood disorder that affects the way you think and feel.  The most common symptom is a feeling of deep sadness.  You may also feel as if you just can't cope with life.  "   Other symptoms include:      * Gaining or loosing weight  * Sleeping too much or too little  * Feeling tired all the time  * Feeling restless  * Fears of harming your baby   * Lack of interest in your baby  * Feeling worthless or guilty  * No longer finding pleasure in things you used to  * Having trouble thinking clearly or making decisions  * Thoughts of hurting yourself or your baby    What Causes Postpartum Depression    The exact causes of postpartum depression isn't known.  It may be due to changes in your hormones during and after childbirth.  You may also be tired from caring for your baby and adjusting to being a mother.  All these factors may make you feel depressed.  In some cases, your genes may also play a role.    Depression Can Be Treated    The good news is that there are many ways to treat postpartum depression.  Talking to your doctor is the first step toward feeling better.    Resources:    * National [place] of Mental Health  -- 254.824.2346    www.nimh.nih.gov    * National [place] on Mental Illness --884.907.2663    Www.travis.org    * Mental Health [place] -- 333.866.5759     Www.[facility].org    * National Suicide Hotline --431.347.4597 (800-SUICIDE)    5224-4422 [employer]  All rights reserved.  This information is not intended as a substitute for professional medical care.  Always follow up with your healthcare professional's instructions.      Breastfeeding Discharge Instructions       Feed the baby at the earliest sign of hunger or comfort  o Hands to mouth, sucking motions  o Rooting or searching for something to suck on  o Dont wait for crying - it is a sign of distress     The feedings may be 8-12 times per 24hrs and will not follow a schedule   Avoid pacifiers and bottles for the first 4 weeks   Alternate the breast you start the feeding with, or start with the breast that feels the fullest   Switch breasts when the baby takes himself off the breast or falls  asleep   Keep offering breasts until the baby looks full, no longer gives hunger signs, and stays asleep when placed on his back in the crib   If the baby is sleepy and wont wake for a feeding, put the baby skin-to-skin dressed in a diaper against the mothers bare chest   Sleep near your baby   The baby should be positioned and latched on to the breast correctly  o Chest-to-chest, chin in the breast  o Babys lips are flipped outward  o Babys mouth is stretched open wide like a shout  o Babys sucking should feel like tugging to the mother  - The baby should be drinking at the breast:  o You should hear swallowing or gulping throughout the feeding  o You should see milk on the babys lips when he comes off the breast  o Your breasts should be softer when the baby is finished feeding  o The baby should look relaxed at the end of feedings  o After the 4th day and your milk is in:  o The babys poop should turn bright yellow and be loose, watery, and seedy  o The baby should have at least 3-4 poops the size of the palm of your hand per day  o The baby should have at least 5-6 wet diapers per day  o The urine should be light yellow in color  You should drink when you are thirsty and eat a healthy diet when you are    hungry.     Take naps to get the rest you need.   Take medications and/or drink alcohol only with permission of your obstetrician    or the babys pediatrician.  You can also call the [facility],   ([phone]), Monday-Friday, 8am-5pm Central time, to get the most   up-to-date evidence-based information on the use of medications during   pregnancy and breastfeeding.      The baby should be examined by a pediatrician at 3-5 days of age.  Once your   milk comes in, the baby should be gaining at least ½ - 1oz each day and should be back to birthweight no later than 10-14 days of age.          Community Resources    [facility] Breastfeeding Warmline: [phone]  [facility]  clinics: provide incentives and breastpumps to eligible mothers  [employer] ([employer]):  mother-to-mother support group website        www.lll.org  [facility] [employer] mother-to-mother support groups:        www.[employer].iCAD        [facility] [employer] [facility]   Dr. Filippo Fischer website for latch videos and general information:        www.breastfeedinginc.ca  [facility] is a call center that provides information about the safety of taking medications while breastfeeding.  Call [phone], M-F, 8am-5pm, [place].  International Lactation Consultant Association provides resources for assistance:        www.ilca.org  [facility] Breastfeeding Coalition provides informationand resources for parents  and the community    http://[facility].org     Melony Riddle is a mom-to-mom support group:                             www.nobeatriceRoomixer.iCAD//breastfeedng-support/  [employer] for Healthy Babies:  4-542-664-BABY(4990)  [employer] [employer]: a breastfeeding support group for women of color, 749.905.3479

## 2018-12-01 NOTE — PLAN OF CARE
Problem: Patient Care Overview  Goal: Plan of Care Review  Outcome: Ongoing (interventions implemented as appropriate)  Alert, oriented x4. Denies pain/discomfort. Fundus firm, vaginal bleeding small in amount and without clots or odor. Ambulating and voiding without difficulty. Iv site clean dry and intact; free of infiltration, redness, or discomfort. Mother will continue to breastfeed  exclusively and will continue to feed  8 or more times per 24 hr period and on demand. Patient is  currently wearing a support bra and has lanolin at the bedside.

## 2018-12-01 NOTE — LACTATION NOTE
"   12/01/18 1230   Maternal Infant Assessment   Breast Size Issue none   Breast Shape Bilateral:;round   Breast Density Bilateral:;filling   Areola Bilateral:;firm   Nipple(s) Bilateral:;flat;other (see comments)  (everted with stimulation and used of shells)   Nipple Symptoms bilateral:;tender;right:;scabbed   Infant Assessment   Mouth Size average   Tongue/Frenulum Symptoms frenulum marginal;moist;pink;intact   Frenulum marginal   Gum Symptoms moist;pink   Sucking Reflex present   Rooting Reflex present   Swallow Reflex present   LATCH Score   Latch 2-->grasps breast, tongue down, lips flanged, rhythmic sucking   Audible Swallowing 2-->spontaneous and intermittent (24 hrs old)   Type Of Nipple 2-->everted (after stimulation)   Comfort (Breast/Nipple) 1-->filling, red/small blisters/bruises, mild/mod discomfort   Hold (Positioning) 1-->minimal assist, teach one side: mother does other, staff holds   Score (less than 7 for 2/more consecutive times, consult Lactation Consultant) 8   Breasts WDL   Breasts WDL WDL   Pain/Comfort Assessments   Pain Assessment Performed Yes       Number Scale   Presence of Pain complains of pain/discomfort   Location - Side Bilateral   Location nipple(s)   Pain Frequency occasional   Pain Quality soreness   Factors that Aggravate Pain (initial latch)   Factors that Relieve Pain repositioning   Pain Management Interventions other (see comments);position adjusted  (gel pads provided)   RASS (Nazario Agitation-Sedation Scale)   RASS Patient Score 0-->alert and calm   Maternal Infant Feeding   Maternal Preparation breast care;hand hygiene   Maternal Emotional State independent;relaxed   Infant Positioning clutch/"football"   Signs of Milk Transfer audible swallow;breasts soften with feeding;infant jaw motion present;suck/swallow ratio   Presence of Pain no  (after initial latch)   Pain Location nipples, bilateral   Comfort Measures Before/During Feeding infant position adjusted;maternal " position adjusted   Time Spent (min) 30-60 min   Milk Ejection Reflex present   Comfort Measures Following Feeding air-drying encouraged;breast shell(s) used;expressed milk applied   Nipple Shape After Feeding, Right everted, slightly pinched at tip   Latch Assistance yes   Engorgement Measures warm shower encouraged;supportive bra encouraged;manual expression pre feeding;complete emptying encouraged   Breastfeeding Education adequate infant intake;adequate milk volume;diet;importance of skin-to-skin contact;increasing milk supply;medication effects;milk expression, electric pump;milk expression, hand;prenatal vitamins continued;other (see comments)  (speak to ped about frenulum)   Feeding Infant   Feeding Readiness Cues energy for feeding;hand to mouth movements;quiet;rooting;sucking motion present;sustained alertness   Satiety Cues decreased number of sucks;infant releases breast;infant's body extended;sleeping after feeding   Feeding Tolerance/Success adequate pause for breath;alert for feeding;coordinated suck;coordinated swallow;strong suck;rooting;sustained alertness   Feeding Physical Stress Cues color unchanged;respirations unchanged   Effective Latch During Feeding yes   Audible Swallow yes   Suck/Swallow Coordination present   Number of Sucks per Swallow 1   Skin-to-Skin Contact During Feeding no   Lactation Referrals   Lactation Consult Breastfeeding assessment;Knowledge deficit;Breast/nipple pain   Lactation Referrals pediatric care provider;WIC (women, infants and children) program   Lactation Follow-up Date/Time (Pediatric Care Provider) has appt on Monday    Lactation Interventions   Attachment Promotion breastfeeding assistance provided;family involvement promoted;privacy provided;skin-to-skin contact encouraged   Breast Care: Breastfeeding warm shower encouraged;milk massaged towards nipple;manual expression to soften breast   Breastfeeding Assistance assisted with positioning;assisted with  techniques for flat/inverted nipples;feeding cue recognition promoted;feeding on demand promoted;feeding session observed;infant latch-on verified;infant stimulated to wakeful state;infant suck/swallow verified;milk expression/pumping;support offered;nipple shell utilized   Maternal Breastfeeding Support encouragement offered;maternal rest encouraged;maternal nutrition promoted;maternal hydration promoted   Latch Promotion positioning assisted;infant moved to breast;suck stimulated with colostrum drop

## 2018-12-01 NOTE — PLAN OF CARE
Problem: Patient Care Overview  Goal: Plan of Care Review  Outcome: Outcome(s) achieved Date Met: 12/01/18  Mother will exclusively breastfeed on cue 8 or more times in 24 hours. Will record voids/stools. Will monitor baby for signs of adequate feedings. Will call for assistance if needed. Family supportive at bedside.

## 2018-12-01 NOTE — DISCHARGE SUMMARY
[facility]  Obstetrics  Discharge Summary      Patient Name: Virginia Metz  MRN: [id number]  Admission Date: 2018  Hospital Length of Stay: 2 days  Discharge Date and Time:  2018 6:51 AM  Attending Physician: Miracle Ptael MD   Discharging Provider: Nicole Granados MD  Primary Care Provider: Primary Doctor No      * No surgery found *     Hospital Course:  Pt is a 28 y.o. now  by , PPD # 2 was admitted on 2018 for labor. On initial assessment, vital signs were stable.  Patient was subsequently admitted to labor and delivery unit.  Patient subsequently delivered a viable infant, please see delivery information below or full delivery note for further details. Pt was in stable condition post delivery and was transferred to the Mother-Baby Unit in a stable condition. Her postpartum course was uncomplicated. On discharge day, patient's pain is well  controlled with oral pain medications. Pt is tolerating ambulation without SOB or CP, and PO diet without N/V. Reports lochia is minimal in degree. Denies any HA, vision changes, F/C, LE swelling. Pt in stable condition and ready for discharge, instructed to continue PNV, pain medications, and to follow up in the OB clinic with Dr. Patel.      Consults (From admission, onward)        Status Ordering Provider     Inpatient consult to Anesthesiology  Once     Provider:  (Not yet assigned)    Acknowledged MIRACLE PATEL          Final Active Diagnoses:    Diagnosis Date Noted POA    PRINCIPAL PROBLEM:   (normal spontaneous vaginal delivery) [O80] 2018 Not Applicable    37 weeks gestation of pregnancy [Z3A.37] 2018 Not Applicable      Problems Resolved During this Admission:        Labs:   CBC   Recent Labs   Lab 18  0453   WBC 12.01   HGB 10.2*   HCT 33.4*          Feeding Method: breast    Immunizations     Date Immunization Status Dose Route/Site Given by    18 1106 MMR Incomplete 0.5 mL  Subcutaneous/Left deltoid     11/29/18 1106 Tdap Incomplete 0.5 mL Intramuscular/Left deltoid           Delivery:    Episiotomy: None   Lacerations: None   Repair suture:     Repair # of packets: 0   Blood loss (ml): 900     Birth information:  YOB: 2017   Time of birth: 9:06 AM   Sex: female   Delivery type: Vaginal, Spontaneous   Gestational Age: 37w3d    Delivery Clinician:      Other providers:       Additional  information:  Forceps:    Vacuum:    Breech:    Observed anomalies      Living?:           APGARS  One minute Five minutes Ten minutes   Skin color:         Heart rate:         Grimace:         Muscle tone:         Breathing:         Totals: 9  9        Placenta: Delivered:       appearance    Pending Diagnostic Studies:     None          Discharged Condition: good    Disposition: Home or Self Care    Follow Up:  Follow-up Information     Schedule an appointment as soon as possible for a visit with Coby Patel MD.    Specialties:  Obstetrics, Obstetrics and Gynecology  Why:  Postpartum Visit  Contact information:  [address] [place] [address]  [phone]                 Patient Instructions:      Diet Adult Regular     Notify your health care provider if you experience any of the following:  temperature >100.4     Notify your health care provider if you experience any of the following:  persistent nausea and vomiting or diarrhea     Notify your health care provider if you experience any of the following:  severe uncontrolled pain     Notify your health care provider if you experience any of the following:  difficulty breathing or increased cough     Notify your health care provider if you experience any of the following:  severe persistent headache     Notify your health care provider if you experience any of the following:  worsening rash     Notify your health care provider if you experience any of the following:  persistent dizziness, light-headedness, or visual  disturbances     Notify your health care provider if you experience any of the following:  increased confusion or weakness     Activity as tolerated     Medications:  Current Discharge Medication List      START taking these medications    Details   ibuprofen (ADVIL,MOTRIN) 600 MG tablet Take 1 tablet (600 mg total) by mouth every 6 (six) hours as needed (cramping).  Qty: 60 tablet, Refills: 1      oxyCODONE-acetaminophen (PERCOCET) 5-325 mg per tablet Take 1 tablet by mouth every 4 (four) hours as needed.  Qty: 15 tablet, Refills: 0         CONTINUE these medications which have NOT CHANGED    Details   prenatal vit/iron fum/folic ac (PRENATAL 1+1 ORAL) Take by mouth.             Nicole Granados MD  Obstetrics  [facility]

## 2018-12-03 ENCOUNTER — HOSPITAL ENCOUNTER (EMERGENCY)
Facility: HOSPITAL | Age: 28
Discharge: HOME | End: 2018-12-03
Attending: EMERGENCY MEDICINE
Payer: MEDICAID

## 2018-12-03 ENCOUNTER — TELEPHONE (OUTPATIENT)
Dept: OBSTETRICS AND GYNECOLOGY | Facility: CLINIC | Age: 28
End: 2018-12-03

## 2018-12-03 VITALS
HEART RATE: 75 BPM | WEIGHT: 150 LBS | RESPIRATION RATE: 18 BRPM | BODY MASS INDEX: 27.6 KG/M2 | SYSTOLIC BLOOD PRESSURE: 135 MMHG | TEMPERATURE: 98 F | HEIGHT: 62 IN | DIASTOLIC BLOOD PRESSURE: 72 MMHG | OXYGEN SATURATION: 98 %

## 2018-12-03 DIAGNOSIS — O92.13 CRACKED NIPPLE ASSOCIATED WITH LACTATION: ICD-10-CM

## 2018-12-03 DIAGNOSIS — O92.79 PAIN AGGRAVATED BY BREAST FEEDING: Primary | ICD-10-CM

## 2018-12-03 DIAGNOSIS — R52 PAIN AGGRAVATED BY BREAST FEEDING: Primary | ICD-10-CM

## 2018-12-03 PROCEDURE — 99283 EMERGENCY DEPT VISIT LOW MDM: CPT

## 2018-12-03 NOTE — TELEPHONE ENCOUNTER
----- Message from Daysi Long sent at 12/3/2018  8:19 AM CST -----  Contact: Self/ [phone]  Patient asked if she can be seen today. She is trying to breastfeed but nothing comes out. She stated it's sore even when she tries pumping.    Please call.

## 2018-12-03 NOTE — LACTATION NOTE
1200 At bedside in ER with patient. Severe engorgement and nipple damage noted. Assisted mother with breastfeeding baby on right breast with use of nipple shield. Pain score 7/10. Active sucking and swallowing noted. Baby completed feeding and showed satiety cues, asleep. Body relaxed and extended.  Warm moist compresses applied to both breasts and pt set up on [employer] breast pump to empty breasts. Instructions given and pt demonstrated hands on pumping techniques. Discussed importance of emptying breasts frequently. Calls placed to [facility], [facility] and [facility] pharmacies attempting to obtain breast pump for pt. [facility] states pt needs to be authorized and go through a process before receiving a pump and that it is unlikely the patient will be able to get one today. [facility] states pt's medicaid does not cover a pump through them and [facility] needs to be arranged ahead of time by Medicaid. Pt given pump [facility] information from [facility]. Informed pt of [facility] hours. Encouraged rental pump for 1 week until able to obtain a personal breast pump. States she was using a manual pump at home. Informed pt of importance of pumping frequently if baby unable to latch in order to prevent engorgement from happening again. Encouraged pt to allow nipples to rest for 24 hrs to aid in healing. Pt provided nipple shield and gel pads. Discussed signs/symptoms of mastitis and ways to prevent it like handwashing and cleaning/sterilizing of pump parts and pieces. States understanding. Pt's mother at bedside, involved in care. Pt continues to pump, breasts slightly softer. RN to provide ice packs after pumping to reduce inflammation and help with discomfort.     1800 Follow-up phone call placed. Pt's mother answered. States they will not be able to get a pump until tomorrow from [facility] so in the mean time pt plans to use the manual pump she has because she cannot pay for a rental pump.  [facility] pump offered to pt for use until able to obtain a pump through WIC. States yes and that the mother will be on the way to pick it up.

## 2018-12-03 NOTE — TELEPHONE ENCOUNTER
----- Message from Irena Martinez sent at 12/3/2018 10:38 AM CST -----  Contact: [phone]/self  Pt is returning your call

## 2018-12-03 NOTE — ED PROVIDER NOTES
Encounter Date: 12/3/2018       History     Chief Complaint   Patient presents with    Breast Pain     c/o pain to breasts since yesterday. Stopped breastfeeding yesterday. Pt had a vaginal delivery 4 days ago     28-year-old female presents the ED for bilateral breast pain. The patient had a baby 4 days ago, and has been breast-feeding.  On Saturday she stopped breast-feeding because she noticed blood in the breast milk as well as nipple pain. She reports the baby does not have a good latch.  She was pumping, but only has a hand-held pump.  She denies fever, chills, and purulent discharge. This is the 1st baby that she has breast-fed.  She has been using lanolin ointment.  No other complaints at this time.      The history is provided by the patient.     Review of patient's allergies indicates:  No Known Allergies  Past Medical History:   Diagnosis Date    Asthma      History reviewed. No pertinent surgical history.  Family History   Family history unknown: Yes     Social History     Tobacco Use    Smoking status: Never Smoker    Smokeless tobacco: Never Used   Substance Use Topics    Alcohol use: No     Frequency: Never    Drug use: No     Review of Systems   Constitutional: Negative for chills, fatigue and fever.   HENT: Negative for congestion.    Respiratory: Negative for shortness of breath.    Cardiovascular: Negative for chest pain.   Gastrointestinal: Negative for abdominal pain, diarrhea, nausea and vomiting.   Skin: Positive for wound. Negative for rash.        Nipples cracking   Neurological: Negative for weakness and headaches.   Psychiatric/Behavioral: Negative for confusion.   All other systems reviewed and are negative.      Physical Exam     Initial Vitals [12/03/18 1128]   BP Pulse Resp Temp SpO2   137/74 82 18 98.6 °F (37 °C) 100 %      MAP       --         Physical Exam    Nursing note and vitals reviewed.  Constitutional: Vital signs are normal. She appears well-developed and  well-nourished. She is active and cooperative. She is easily aroused.  Non-toxic appearance. She does not have a sickly appearance. She does not appear ill. No distress.   HENT:   Head: Normocephalic and atraumatic.   Eyes: Conjunctivae and EOM are normal.   Neck: Normal range of motion. Neck supple.   Cardiovascular: Normal rate.   Pulmonary/Chest: Effort normal. Right breast exhibits tenderness (nipples). Right breast exhibits no inverted nipple, no mass, no nipple discharge and no skin change. Left breast exhibits tenderness (nipples). Left breast exhibits no inverted nipple, no mass and no skin change. Nipple discharge: milk. Breasts are symmetrical. There is no breast swelling.   Bilateral nipple cracking.  Breast engorgement.   Abdominal: Soft. Normal appearance and bowel sounds are normal.   Genitourinary: No breast bleeding.   Neurological: She is alert, oriented to person, place, and time and easily aroused. She has normal strength. GCS eye subscore is 4. GCS verbal subscore is 5. GCS motor subscore is 6.   Skin: Skin is warm, dry and intact. No bruising, no rash and no abscess noted. No erythema.   Psychiatric: She has a normal mood and affect. Her speech is normal and behavior is normal. Judgment and thought content normal. Cognition and memory are normal.         ED Course   Procedures  Labs Reviewed - No data to display       Imaging Results    None          Medical Decision Making:   Initial Assessment:   28-year-old female here for bilateral breast pain.  She recently had a baby, and stopped breast-feeding on Saturday.  The patient denies fever and chills.  The patient appears well, nontoxic.  Vitals stable.  She has bilateral nipple cracking.  No signs of infection or abscess.  Differential Diagnosis:   Dermatitis, mastitis, cellulitis  ED Management:  Exam  Consult with lactation nurse  No indication for labs or imaging at this time.  The patient does not have a breast abscess.  I feel the patient's  pain is due to engorgement as well as bilateral nipple cracking.  She was evaluated by the lactation consultant.  I encouraged the patient to keep breast-feeding and or pumping.  The patient is to follow up with her Ob/gyn within 1 week.  Return to the ER if her condition changes, progresses, or if there are any concerns.  The patient verbalized understanding, compliance, and agreement with the treatment plan.                   ED Course as of Dec 03 1143   Mon Dec 03, 2018   1130 Triage Sort Note: Virginia Metz, a nontoxic/well appearing, 28 y.o. female, presented to the ED with c/o bilateral breast pain while breast feeding.     Patient seen and medically screened by Nurse Practitioner in triage. Orders initiated at triage to expedite care. Care will be transferred to an alternate provider when patient was placed in an Exam Room from the [facility] for physical exam, additional orders, and disposition.  11:30 AM Azalea Gamino DNP, AYUSH-BC      [AT]      ED Course User Index  [AT] AYUSH Sherwood     Clinical Impression:   The primary encounter diagnosis was Pain aggravated by breast feeding. A diagnosis of Cracked nipple associated with lactation was also pertinent to this visit.                             AYUSH Perez  12/03/18 0553

## 2018-12-03 NOTE — DISCHARGE INSTRUCTIONS
Follow up with your lactation nurse.  Continue breast feeding or pumping.  Return to the ED if your condition changes, progresses, or if you have any concerns.

## 2018-12-04 ENCOUNTER — TELEPHONE (OUTPATIENT)
Dept: OBSTETRICS AND GYNECOLOGY | Facility: CLINIC | Age: 28
End: 2018-12-04

## 2018-12-05 NOTE — TELEPHONE ENCOUNTER
Mom left message on warmline. Returned call. Spoke to Virginia's mother. Pt not available at this time. Stated that they wanted to let Kristopher know that she is doing better today & will need to keep the breast pump until 12/17/18. Nipples are still too painful to put baby to breast. Has been pumping/bottle feeding EBM. Still has some areas that are firm at times. Encouraged frequent BR/pumping; warm compresses before & cold after. Discussed s/s of mastitis-denies any. Encouraged to call MD if have any. Has been using gel pads-helping. Encouraged to put baby back to breast as soon as nipples better. Lots of reassurance provided. Questions answered. Instructed to have mom call for any further needs. Verbalized understanding.

## 2018-12-14 ENCOUNTER — HOSPITAL ENCOUNTER (EMERGENCY)
Facility: HOSPITAL | Age: 28
Discharge: HOME | End: 2018-12-14
Attending: EMERGENCY MEDICINE
Payer: MEDICAID

## 2018-12-14 ENCOUNTER — TELEPHONE (OUTPATIENT)
Dept: OBSTETRICS AND GYNECOLOGY | Facility: CLINIC | Age: 28
End: 2018-12-14

## 2018-12-14 VITALS
HEIGHT: 62 IN | RESPIRATION RATE: 18 BRPM | BODY MASS INDEX: 27.6 KG/M2 | TEMPERATURE: 99 F | HEART RATE: 70 BPM | OXYGEN SATURATION: 100 % | WEIGHT: 150 LBS | SYSTOLIC BLOOD PRESSURE: 125 MMHG | DIASTOLIC BLOOD PRESSURE: 70 MMHG

## 2018-12-14 DIAGNOSIS — N64.4 BREAST PAIN, RIGHT: Primary | ICD-10-CM

## 2018-12-14 PROCEDURE — 25000003 PHARM REV CODE 250: Performed by: NURSE PRACTITIONER

## 2018-12-14 PROCEDURE — 99283 EMERGENCY DEPT VISIT LOW MDM: CPT

## 2018-12-14 RX ORDER — IBUPROFEN 600 MG/1
600 TABLET ORAL
Status: COMPLETED | OUTPATIENT
Start: 2018-12-14 | End: 2018-12-14

## 2018-12-14 RX ORDER — DICLOXACILLIN SODIUM 500 MG/1
500 CAPSULE ORAL EVERY 6 HOURS
Qty: 28 CAPSULE | Refills: 0 | Status: SHIPPED | OUTPATIENT
Start: 2018-12-14 | End: 2018-12-21

## 2018-12-14 RX ADMIN — IBUPROFEN 600 MG: 600 TABLET, FILM COATED ORAL at 02:12

## 2018-12-14 NOTE — ED NOTES
Pt here /co rigth breast wtihincrease in firmness, tenderness to medial side of breast and decrease amount of milk being pumped--pt is pumping breast milk to feed her baby. GRAVA2/PARA-no complications with birth. Mother states s/s started 2 days ago and is wrose. Did not take ibuprofen today. States it does relieve pain a little. Denies fevers. No redness observed-ski is hot, breast is firm/hard to touch on medial aspect of entire breast. Pt state eulalio is limiting her arm movement.

## 2018-12-14 NOTE — PLAN OF CARE
Problem: Breastfeeding  Goal: Effective Breastfeeding  Outcome: Ongoing (interventions implemented as appropriate)  Mother will pump 8 or more times in 24 hours with no long stretches taken between times. Will monitor for signs of mastitis and notify doctor and/or call lactation center. Will call for assistance if needed.

## 2018-12-14 NOTE — LACTATION NOTE
[facility]  Lactation Note - Mom    SUMMARY     Maternal Assessment    Breast Shape: Bilateral:, round  Breast Density: Bilateral:, full  Nipples: Bilateral:, other (see comments)(WNL)  Breast Signs/Symptoms of Infection: chills, warmth, increased, other (see comments)(tenderness)  Pain Rating (0-10): Rest: 7    LATCH Score         Breasts WDL    Breast WDL: WDL except, breast symptoms  Left Breast Symptoms: full  Right Breast Symptoms: full, painful(medial portion of right breast firm, tender to touch)    Maternal Infant Feeding    Maternal Preparation: breast care  Maternal Emotional State: assist needed, anxious  Comfort Measures Before/During Feeding: moist heat to breast(s)  Milk Ejection Reflex: present    Lactation Referrals    Lactation Referrals: other (see comments)(OB/GYN for s/s mastitis)    Lactation Interventions    Breast Care: Breastfeeding: supportive bra utilized, warm compress applied  Breastfeeding Assistance: electric breast pump used, support offered  Breast Care: Breastfeeding: supportive bra utilized, warm compress applied  Breastfeeding Assistance: electric breast pump used, support offered  Breastfeeding Support: encouragement provided, maternal rest encouraged, maternal hydration promoted(encouraged to set timer to remind when to pump again)       Breastfeeding Session    Breast Pumping Interventions: frequent pumping encouraged    Maternal Information    Date of Referral: 12/14/18  Person Making Referral: nurse(Joanna, ED RN)  Maternal Reason for Referral: breastfeeding currently, engorgement

## 2018-12-14 NOTE — LACTATION NOTE
"1430 Down to see pt after receiving phone call from pt's RN in ED. Pt arrived with c/o pain to right medial breast. Mild increased warmth and inflammation noted to area. No discolorations or discharge seen. Pt states very tender to the touch. States she last pumped at about 8 or 9 am this morning using her [employer] electric pump. States she feels her pump is working well to soften her breasts. States she has been running errands and has been unable to pump in several hours. Discussed importance of emptying the breasts frequently, no longer than about every 3 hrs. Encouraged to bring pump with her while out and to utilize it frequently even if for only 10 minutes or so to obtain some relief. Discussed s/s of mastitis. Fever, chills,. Body aches, discharge, redness and increased warmth to breast. Pt states she has some chills right now. Afebrile per RN. Warm moist compresses applied to breasts and pt set up on [employer] double electric hospital grade breast pump. Pt used hands on techniques to empty breasts. After about 30 minutes of pumping, pt obtained 9 oz of expressed breast milk total from both breasts. Breasts noticeable softer. Pt able to touch breast with minimal tenderness. Stated pain 8/10 prior to pumping then decreased to 1/10 after pumping. Instructed on "heat, rest, empty breasts" and given mastitis precautions. Discouraged tight clothing. Educated on proper fitting bra without under wire. Spoke to pt's nurse informing of pt's status. MONSERRAT Springer at bedside states ultrasound pending to rule out abscess. Discussed plan of care and recommendation to consult pt's OB/GYN for direction whether or not to treat with antibiotics. Discussed this could be early onset mastitis. Pt denied any further questions or needs. DILEEP Randolph RN, IBCLC also present at bedside assisting with care.    "

## 2018-12-14 NOTE — ED PROVIDER NOTES
"Encounter Date: 12/14/2018       History     Chief Complaint   Patient presents with    Breast Pain     Pt c/o right breast pain, delivered baby 2 weeks ago. Denies d/c from nipple, discoloration, or fevers. States she pumps. Pt has 2 week old with her.     Patient is a 28-year-old female who presents to ED with right breast pain since yesterday. Patient is currently pumping breast milk for 2-week old infant that is with her. Patient denies any complications with birth. Patient also reports that her milk on the right breast "has decreased." Last time patient pumped was around 8-9:00 this am. Patient associates malaise. Denies fever, chills, skin changes, or drainage from nipples. Patient has been taking ibuprofen with improvement of pain. Denies any alleviating or exacerbating factors.       The history is provided by the patient.     Review of patient's allergies indicates:  No Known Allergies  Past Medical History:   Diagnosis Date    Asthma      History reviewed. No pertinent surgical history.  Family History   Family history unknown: Yes     Social History     Tobacco Use    Smoking status: Never Smoker    Smokeless tobacco: Never Used   Substance Use Topics    Alcohol use: No     Frequency: Never    Drug use: No     Review of Systems   Constitutional: Negative for chills and fever.        + malaise    HENT: Negative for sore throat.    Respiratory: Negative for shortness of breath.    Gastrointestinal: Negative for abdominal pain, diarrhea, nausea and vomiting.   Genitourinary: Negative for dysuria.        + Pain in right breast.    Musculoskeletal: Negative for back pain, neck pain and neck stiffness.   Skin: Negative for color change and rash.   Hematological: Does not bruise/bleed easily.   All other systems reviewed and are negative.      Physical Exam     Initial Vitals [12/14/18 1222]   BP Pulse Resp Temp SpO2   (!) 145/65 88 16 99.2 °F (37.3 °C) 100 %      MAP       --         Physical " Exam    Vitals reviewed.  Constitutional: Vital signs are normal. She appears well-developed. She is cooperative.   HENT:   Head: Normocephalic.   Nose: Nose normal.   Eyes: Pupils are equal, round, and reactive to light.   Neck: Normal range of motion.   Cardiovascular: Normal rate, regular rhythm and normal heart sounds.   Pulses:       Posterior tibial pulses are 2+ on the right side, and 2+ on the left side.   Pulmonary/Chest: No respiratory distress.       Marked area of medial aspect of breast with induration and tenderness to palpation.  Slight warmth.   No drainage, inverted nipples, or skin changes noted bilaterally.  No asymmetry in breasts noted.      Abdominal: Soft.   Neurological: She is alert and oriented to person, place, and time.   Skin: Skin is warm, dry and intact.   Psychiatric: She has a normal mood and affect. Her speech is normal and behavior is normal. Judgment normal. Cognition and memory are normal.         ED Course   Procedures  Labs Reviewed - No data to display       Imaging Results    None          Medical Decision Making:   History:   Old Medical Records: I decided to obtain old medical records.  Initial Assessment:   Patient presents to ED for evaluation of right breast pain since yesterday.  Appears well, nontoxic.  Afebrile.  TTP to medial aspect of breast with induration and tenderness to palpation.  Slight warmth.  No drainage, inverted nipples, or skin changes noted bilaterally. No asymmetry in breast noted.  ED Management:  PO ibuprofen     1:57 PM- CHANDRKIA De La Vega consulted the lactation nurse here at [facility] and she is going to come evaluate patient.     2:37 PM- Lactation consultant at bedside.     2:47 PM- Lactation consultant at bedside, patient is pumping and producing milk, area of induration on right medial breast has softened but continues to be TTP.    3:58 PM- No abscess noted on bedside US. 9 ounces of breast milk was pumped.      Other:   I discussed test(s) with  the performing physician.       <> Summary of the Findings: Care of this patient discussed with Dr. Flaherty, who agrees with this patient's ED course.   I have discussed this case with another health care provider.       <> Summary of the Discussion: 3:58 PM- Dr. Mcfarland, who is on call for Dr. Patel's paged for consult.    4:08- Discussed patient's HPI and ED course with Dr. Mcfarland, he said that if we think that she needs to be on antibiotics then start her on Dicloxacillin, continue to breast feed, do warm compresses, take OTC ibuprofen or tylenol for pain, and to call their office on Monday and if Dr. Patle cannot see patient, then he (Dr. Mcfarland) will.   Patient is hemodynamically stable will be DC home with prescription for dicloxacillin.  I informed patient of Dr. Mcfarland's recommendations. Patient verbalized understanding, compliance, and agreement with treatment plan.                   ED Course as of Dec 14 1351   Fri Dec 14, 2018   1222 Sort note: Virginia Metz nontoxic/afebrile 28 y.o.  presented to the ED with c/o right breast pain. Patient is currently pumping breast milk for 2-week-old infant that is with her.  Patient does report some malaise however denies any fever, chills, skin changes or drainage from the nipple.     Patient seen and medically screened by Physician assistant in Sort process due to ED crowding.  Appropriate tests and/or medications ordered.  Care transferred to an alternate provider when patient was placed in an Exam Room from the [facility] for physical exam, additional orders, and disposition. 12:24 PM. LC    [LC]      ED Course User Index  [LC] JOSE Leonard     Clinical Impression:   The encounter diagnosis was Breast pain, right.                             Gian Mathew NP  12/17/18 1967

## 2018-12-14 NOTE — ED NOTES
Lactation nurses in room. Warm compresses,pumping and recommend call DrTruit and discuss possible antibiotic Rx --notified ROD Mathew NP.

## 2018-12-14 NOTE — DISCHARGE INSTRUCTIONS
I spoke with Dr. Mcfarland, Dr. Patel's partner, he said to start you on this antibiotic (Dicloxacillin) because it is safe while you are breastfeeding, to continue to breast feed. Continue warm compresses and to call their office on Monday, December 17, 2018 for an appointment with either him or Dr. Patel. Return to ED for any concerns or worsening symptoms.

## 2018-12-14 NOTE — TELEPHONE ENCOUNTER
----- Message from Emilia Dooley sent at 12/14/2018 11:05 AM CST -----  Contact: Krystyna, mother, [phone]  Requests for patient to be seen today, states her breasts are hurting and can barely raise her arms. Please advise.

## 2018-12-14 NOTE — ED NOTES
Reinforced, frequent pumping and to bring pump with her wherever she goes and s/s to return for, follow up care and hydration.

## 2018-12-14 NOTE — TELEPHONE ENCOUNTER
Patient does not have a contact so had to speak with patient's mother.   Advised if patient is in that much pain she needs to go to Urgent Care or the ED as  is gone for the day and will not return to the office until Monday.   Can do UC/ED follow up with Dr. Patel on Monday at 8am.  Patient's mother verbalized understanding

## 2018-12-17 ENCOUNTER — TELEPHONE (OUTPATIENT)
Dept: OBSTETRICS AND GYNECOLOGY | Facility: CLINIC | Age: 28
End: 2018-12-17

## 2018-12-17 NOTE — TELEPHONE ENCOUNTER
Pt's mother answered, states [place] not with her at this time. States she is doing much better and does not have any pain to her breasts anymore. Encouraged to have pt call lactation center when she can. States ok and appreciation for the phone call.

## 2019-01-03 ENCOUNTER — POSTPARTUM VISIT (OUTPATIENT)
Dept: OBSTETRICS AND GYNECOLOGY | Facility: CLINIC | Age: 29
End: 2019-01-03
Payer: MEDICAID

## 2019-01-03 VITALS
BODY MASS INDEX: 26.65 KG/M2 | DIASTOLIC BLOOD PRESSURE: 70 MMHG | WEIGHT: 144.81 LBS | SYSTOLIC BLOOD PRESSURE: 108 MMHG | HEIGHT: 62 IN

## 2019-01-03 DIAGNOSIS — Z12.4 ROUTINE CERVICAL SMEAR: ICD-10-CM

## 2019-01-03 LAB
B-HCG UR QL: NEGATIVE
CTP QC/QA: YES

## 2019-01-03 PROCEDURE — 99999 PR PBB SHADOW E&M-EST. PATIENT-LVL III: ICD-10-PCS | Mod: PBBFAC,,, | Performed by: OBSTETRICS & GYNECOLOGY

## 2019-01-03 PROCEDURE — 87624 HPV HI-RISK TYP POOLED RSLT: CPT

## 2019-01-03 PROCEDURE — 81025 URINE PREGNANCY TEST: CPT | Mod: PBBFAC,PO | Performed by: OBSTETRICS & GYNECOLOGY

## 2019-01-03 PROCEDURE — 88141 LIQUID-BASED PAP SMEAR, SCREENING: ICD-10-PCS | Mod: ,,, | Performed by: PATHOLOGY

## 2019-01-03 PROCEDURE — 99213 OFFICE O/P EST LOW 20 MIN: CPT | Mod: PBBFAC,PO | Performed by: OBSTETRICS & GYNECOLOGY

## 2019-01-03 PROCEDURE — 88141 CYTOPATH C/V INTERPRET: CPT | Mod: ,,, | Performed by: PATHOLOGY

## 2019-01-03 PROCEDURE — 99999 PR PBB SHADOW E&M-EST. PATIENT-LVL III: CPT | Mod: PBBFAC,,, | Performed by: OBSTETRICS & GYNECOLOGY

## 2019-01-03 PROCEDURE — 88175 CYTOPATH C/V AUTO FLUID REDO: CPT | Performed by: PATHOLOGY

## 2019-01-03 PROCEDURE — 59430 PR CARE AFTER DELIVERY ONLY: ICD-10-PCS | Mod: S$PBB,,, | Performed by: OBSTETRICS & GYNECOLOGY

## 2019-01-03 NOTE — PROGRESS NOTES
"OBSTETRIC HISTORY:   OB History as of 18      Para Term  AB Living    3 3 2 0 0 2    SAB TAB Ectopic Multiple Live Births    0 0 0 0 2         COMPREHENSIVE GYN HISTORY:  PAP History: Denies abnormal Paps.  Infection History: Denies STDs. Denies PID.  Benign History: Denies uterine fibroids. Denies ovarian cysts. Denies endometriosis.   Cancer History: Denies cervical cancer. Denies uterine cancer or hyperplasia. Denies ovarian cancer. Denies vulvar cancer or pre-cancer. Denies vaginal cancer or pre-cancer. Denies breast cancer. Denies colon cancer.  Sexual Activity History:   reports that she currently engages in sexual activity and has had partners who are Male. She reports using the following method of birth control/protection: None.        HPI:   28 y.o.  No LMP recorded.   Patient is  here for postpartum exam. She is breast feeding. Denies depression. For now she will use abstinence for birth control as her [place] is [place] of [place]. UPT is negative. She has no complaints. She denies bladder, breast and bowel complaints.    ROS:  GENERAL: Denies weight gain or weight loss. Feeling well overall.   SKIN: Denies rash or lesions.   HEAD: Denies headache.   NODES: Denies enlarged lymph nodes.   CHEST: Denies shortness of breath.   ABDOMEN: No abdominal pain, constipation, diarrhea, nausea, vomiting or rectal bleeding.   URINARY: No frequency, dysuria, hematuria, or burning on urination.  REPRODUCTIVE: See HPI.   BREASTS: The patient denies pain, lumps, or nipple discharge.   HEMATOLOGIC: No easy bruisability.   MUSCULOSKELETAL: Denies joint pain or back pain.   NEUROLOGIC: Denies weakness.   PSYCHIATRIC: Denies depression, anxiety or mood swings.    PE:   /70   Ht 5' 2" (1.575 m)   Wt 65.7 kg (144 lb 13.5 oz)   Breastfeeding? Yes   BMI 26.49 kg/m²   APPEARANCE: Well nourished, well developed, in no acute distress.  NECK: Neck symmetric without  thyromegaly.  NODES: No inguinal, " cervical lymph node enlargement.  CHEST: Lungs clear to auscultation.  HEART: Regular rate and rhythm, no murmurs, rubs or gallops.  ABDOMEN: Soft. No tenderness or masses. No hernias.  BREASTS: Symmetrical, no skin changes or visible lesions. No palpable masses, nipple discharge or adenopathy bilaterally.  PELVIC:   VULVA: No lesions. Normal female genitalia.  URETHRAL MEATUS: Normal size and location, no lesions, no prolapse.  URETHRA: No masses, tenderness, prolapse or scarring.  VAGINA: Moist and well rugated, no discharge, no significant cystocele or rectocele.  CERVIX: No lesions and discharge.  UTERUS: Normal size, regular shape, mobile, non-tender, bladder base nontender.  ADNEXA: No masses or tenderness.    PROCEDURES:  Pap smear    Assessment/Plan:  PP Exam  RTO one year

## 2019-01-21 LAB
HPV HR 12 DNA CVX QL NAA+PROBE: NEGATIVE
HPV16 AG SPEC QL: NEGATIVE
HPV18 DNA SPEC QL NAA+PROBE: NEGATIVE

## 2019-01-22 ENCOUNTER — PATIENT MESSAGE (OUTPATIENT)
Dept: OBSTETRICS AND GYNECOLOGY | Facility: HOSPITAL | Age: 29
End: 2019-01-22

## 2019-02-15 ENCOUNTER — OFFICE VISIT (OUTPATIENT)
Dept: OBSTETRICS AND GYNECOLOGY | Facility: CLINIC | Age: 29
End: 2019-02-15
Payer: MEDICAID

## 2019-02-15 VITALS
WEIGHT: 149.69 LBS | SYSTOLIC BLOOD PRESSURE: 130 MMHG | HEIGHT: 62 IN | BODY MASS INDEX: 27.55 KG/M2 | DIASTOLIC BLOOD PRESSURE: 82 MMHG

## 2019-02-15 DIAGNOSIS — R10.32 LLQ PAIN: ICD-10-CM

## 2019-02-15 DIAGNOSIS — N83.202 CYST OF LEFT OVARY: Primary | ICD-10-CM

## 2019-02-15 PROCEDURE — 99213 OFFICE O/P EST LOW 20 MIN: CPT | Mod: PBBFAC,PO | Performed by: OBSTETRICS & GYNECOLOGY

## 2019-02-15 PROCEDURE — 99999 PR PBB SHADOW E&M-EST. PATIENT-LVL III: ICD-10-PCS | Mod: PBBFAC,,, | Performed by: OBSTETRICS & GYNECOLOGY

## 2019-02-15 PROCEDURE — 87086 URINE CULTURE/COLONY COUNT: CPT

## 2019-02-15 PROCEDURE — 99999 PR PBB SHADOW E&M-EST. PATIENT-LVL III: CPT | Mod: PBBFAC,,, | Performed by: OBSTETRICS & GYNECOLOGY

## 2019-02-15 PROCEDURE — 99213 PR OFFICE/OUTPT VISIT, EST, LEVL III, 20-29 MIN: ICD-10-PCS | Mod: S$PBB,,, | Performed by: OBSTETRICS & GYNECOLOGY

## 2019-02-15 PROCEDURE — 99213 OFFICE O/P EST LOW 20 MIN: CPT | Mod: S$PBB,,, | Performed by: OBSTETRICS & GYNECOLOGY

## 2019-02-15 NOTE — PROGRESS NOTES
"OBSTETRIC HISTORY:   OB History      Para Term  AB Living    2 2 1 0 0 2    SAB TAB Ectopic Multiple Live Births    0 0 0 0 2         COMPREHENSIVE GYN HISTORY:  PAP History: Denies abnormal Paps.  Infection History: Denies STDs. Denies PID.  Benign History: Denies uterine fibroids. Denies ovarian cysts. Denies endometriosis.   Cancer History: Denies cervical cancer. Denies uterine cancer or hyperplasia. Denies ovarian cancer. Denies vulvar cancer or pre-cancer. Denies vaginal cancer or pre-cancer. Denies breast cancer. Denies colon cancer.  Sexual Activity History:   reports that she currently engages in sexual activity and has had partners who are Male. She reports using the following method of birth control/protection: None.           HPI:   28 y.o.  Patient's last menstrual period was 2019.   Patient is  here complaining of lower pelvic pain. UA + leukocytes. She denies bladder, breast and bowel complaints.    ROS:  GENERAL: Denies weight gain or weight loss. Feeling well overall.   SKIN: Denies rash or lesions.   HEAD: Denies headache.   NODES: Denies enlarged lymph nodes.   CHEST: Denies shortness of breath.   ABDOMEN: No constipation, diarrhea, nausea, vomiting or rectal bleeding.   URINARY: No frequency, dysuria, hematuria, or burning on urination.  REPRODUCTIVE: See HPI.   BREASTS: The patient denies pain, lumps, or nipple discharge.   HEMATOLOGIC: No easy bruisability.   MUSCULOSKELETAL: Denies joint pain or back pain.   NEUROLOGIC: Denies weakness.   PSYCHIATRIC: Denies depression, anxiety or mood swings.    PE:   /82   Ht 5' 2" (1.575 m)   Wt 67.9 kg (149 lb 11.1 oz)   LMP 2019   BMI 27.38 kg/m²   APPEARANCE: Well nourished, well developed, in no acute distress.  ABDOMEN: Soft. No tenderness or masses. No hernias. Asymmetric pelvic crests (+LBP)  PELVIC:   VULVA: No lesions. Normal female genitalia.  URETHRAL MEATUS: Normal size and location, no lesions, no " prolapse.  URETHRA: No masses, tenderness, prolapse or scarring.  VAGINA: Moist and well rugated, no discharge, no significant cystocele or rectocele.  CERVIX: No lesions and discharge.  UTERUS: Normal size, regular shape, mobile, non-tender, bladder base nontender.  ADNEXA: Left adnexal fullness and tenderness.    ASSESSMENT:  LLQ pain and tenderness  Asymmetric pelvic crests  Abnormal urinalysis    PLAN:  RTO for TVUS

## 2019-02-17 LAB — BACTERIA UR CULT: NORMAL

## 2019-02-25 ENCOUNTER — PROCEDURE VISIT (OUTPATIENT)
Dept: OBSTETRICS AND GYNECOLOGY | Facility: CLINIC | Age: 29
End: 2019-02-25
Payer: MEDICAID

## 2019-02-25 ENCOUNTER — TELEPHONE (OUTPATIENT)
Dept: OBSTETRICS AND GYNECOLOGY | Facility: CLINIC | Age: 29
End: 2019-02-25

## 2019-02-25 DIAGNOSIS — N83.202 CYST OF LEFT OVARY: ICD-10-CM

## 2019-02-25 PROCEDURE — 76830 TRANSVAGINAL US NON-OB: CPT | Mod: PBBFAC,PO

## 2019-02-25 PROCEDURE — 76830 TRANSVAGINAL US NON-OB: CPT | Mod: 26,S$PBB,, | Performed by: OBSTETRICS & GYNECOLOGY

## 2019-02-25 PROCEDURE — 76830 PR  ECHOGRAPHY,TRANSVAGINAL: ICD-10-PCS | Mod: 26,S$PBB,, | Performed by: OBSTETRICS & GYNECOLOGY

## 2019-02-26 ENCOUNTER — PATIENT MESSAGE (OUTPATIENT)
Dept: OBSTETRICS AND GYNECOLOGY | Facility: CLINIC | Age: 29
End: 2019-02-26

## 2019-02-27 ENCOUNTER — PATIENT MESSAGE (OUTPATIENT)
Dept: OBSTETRICS AND GYNECOLOGY | Facility: CLINIC | Age: 29
End: 2019-02-27

## 2019-02-27 NOTE — TELEPHONE ENCOUNTER
Notify there were some calcifications in lining of uterus that are meaningless and not the cause of her problems. When I saw her I felt she was having some musculoskeletal issues and she might would benefit from physical therapy or a chiropractor or even massage.

## 2019-11-11 PROBLEM — Z3A.37 37 WEEKS GESTATION OF PREGNANCY: Status: RESOLVED | Noted: 2018-11-29 | Resolved: 2019-11-11
